# Patient Record
Sex: MALE | Race: WHITE | Employment: FULL TIME | URBAN - NONMETROPOLITAN AREA
[De-identification: names, ages, dates, MRNs, and addresses within clinical notes are randomized per-mention and may not be internally consistent; named-entity substitution may affect disease eponyms.]

---

## 2018-06-05 ENCOUNTER — OFFICE VISIT (OUTPATIENT)
Dept: FAMILY MEDICINE CLINIC | Facility: CLINIC | Age: 28
End: 2018-06-05

## 2018-06-05 ENCOUNTER — TELEPHONE (OUTPATIENT)
Dept: FAMILY MEDICINE CLINIC | Facility: CLINIC | Age: 28
End: 2018-06-05

## 2018-06-05 VITALS
HEART RATE: 76 BPM | BODY MASS INDEX: 32.14 KG/M2 | DIASTOLIC BLOOD PRESSURE: 76 MMHG | HEIGHT: 70.5 IN | WEIGHT: 227 LBS | SYSTOLIC BLOOD PRESSURE: 120 MMHG | OXYGEN SATURATION: 99 % | TEMPERATURE: 98 F

## 2018-06-05 DIAGNOSIS — K21.00 GASTROESOPHAGEAL REFLUX DISEASE WITH ESOPHAGITIS: Primary | ICD-10-CM

## 2018-06-05 PROCEDURE — 99213 OFFICE O/P EST LOW 20 MIN: CPT | Performed by: FAMILY MEDICINE

## 2018-06-05 RX ORDER — FAMOTIDINE 20 MG/1
20 TABLET ORAL 2 TIMES DAILY
COMMUNITY
Start: 2018-06-04 | End: 2018-06-18

## 2018-06-05 NOTE — PROGRESS NOTES
HPI:    Patient ID: Maddy Barth is a 28year old male.   Patient presents with:  ER F/U: [facility]--- 6/4 C/O CHEST PAINS--    HPI pt c/o sharp fleeting chest pains off and on x 2 months but yesterday the pain was constant, associated with nausea, diaphores SpO2 99 %. ASSESSMENT/PLAN:   Gastroesophageal reflux disease with esophagitis  (primary encounter diagnosis)  Patient Instructions   Anti-reflux measures reviewed. Avoid large meals.  Allow at least 3 hrs between eating and laying down to [address] [facility]

## 2019-12-05 ENCOUNTER — TELEPHONE (OUTPATIENT)
Dept: FAMILY MEDICINE CLINIC | Facility: CLINIC | Age: 29
End: 2019-12-05

## 2019-12-05 NOTE — TELEPHONE ENCOUNTER
Tried to call the ph# back, but it was another fax#.      FAX SENT TO THEIR [facility] STATING WE CANNOT SEND THEM RECORDS WITHOUT A MEDICAL RELEASE SIGNED BY PT